# Patient Record
Sex: MALE | Race: OTHER | NOT HISPANIC OR LATINO | URBAN - METROPOLITAN AREA
[De-identification: names, ages, dates, MRNs, and addresses within clinical notes are randomized per-mention and may not be internally consistent; named-entity substitution may affect disease eponyms.]

---

## 2023-03-08 ENCOUNTER — EMERGENCY (EMERGENCY)
Facility: HOSPITAL | Age: 33
LOS: 1 days | Discharge: HOME | End: 2023-03-08
Attending: EMERGENCY MEDICINE | Admitting: EMERGENCY MEDICINE
Payer: MEDICAID

## 2023-03-08 VITALS
TEMPERATURE: 101 F | RESPIRATION RATE: 14 BRPM | HEART RATE: 73 BPM | OXYGEN SATURATION: 99 % | SYSTOLIC BLOOD PRESSURE: 124 MMHG | DIASTOLIC BLOOD PRESSURE: 75 MMHG

## 2023-03-08 VITALS
TEMPERATURE: 99 F | HEART RATE: 80 BPM | RESPIRATION RATE: 16 BRPM | DIASTOLIC BLOOD PRESSURE: 75 MMHG | OXYGEN SATURATION: 96 % | SYSTOLIC BLOOD PRESSURE: 115 MMHG

## 2023-03-08 PROCEDURE — 99284 EMERGENCY DEPT VISIT MOD MDM: CPT

## 2023-03-08 RX ORDER — ACETAMINOPHEN 500 MG
650 TABLET ORAL ONCE
Refills: 0 | Status: COMPLETED | OUTPATIENT
Start: 2023-03-08 | End: 2023-03-08

## 2023-03-08 RX ORDER — IBUPROFEN 200 MG
600 TABLET ORAL ONCE
Refills: 0 | Status: COMPLETED | OUTPATIENT
Start: 2023-03-08 | End: 2023-03-08

## 2023-03-08 RX ADMIN — Medication 650 MILLIGRAM(S): at 21:05

## 2023-03-08 RX ADMIN — Medication 1 TABLET(S): at 21:05

## 2023-03-08 RX ADMIN — Medication 600 MILLIGRAM(S): at 21:05

## 2023-03-08 NOTE — ED PROVIDER NOTE - CLINICAL SUMMARY MEDICAL DECISION MAKING FREE TEXT BOX
33M p/w R lower face swelling x 2 days + dental pain.  Pt went to own dentist and they rx'ed augmentin and pt has taken 2 doses and is due for one now.  last dose of tylenol/motrin this morning.  Never happened before.  Pt also reports sore throat, R lower face swelling, and odynophagia.  noted to be febrile here 100.7. PMHX sinus infections most recently last week.  PMHX sinus infection.  PSHX none.  No T.  NKDA.  Febrile.  Mild distress R face pain but nontoxic appearing.  R lateral face lower mild swelling.  R lower posterior molar lateral vestibular swelling and mild exudate there and mild ttp.  Airway patent.  Rx pain meds, abx, dental consult for drainage.  Imaging per Dental recc's.

## 2023-03-08 NOTE — ED PROVIDER NOTE - NSFOLLOWUPINSTRUCTIONS_ED_ALL_ED_FT
FOLLOW UP WITH YOUR  DENTIST WITHIN 1 WEEK  BRING THE COPIES OF YOUR RESULTS WITH YOU (PROVIDED)  CAN TAKE TYLENOL 650MG ORALLY EVERY 6 HOURS FOR PAIN OR FEVER.  IBUPROFEN 400MG ORALLY EVERY 6 HOURS FOR PAIN OR FEVER.    CAN TAKE TYLENOL AND IBUPROFEN AT THE SAME TIME  RETURN TO ED FOR NEW OR WORSENING SYMPTOMS.

## 2023-03-08 NOTE — ED PROVIDER NOTE - OBJECTIVE STATEMENT
33M p/w R lower face swelling x 2 days + dental pain.  Pt went to own dentist and they rx'ed augmentin and pt has taken 2 doses and is due for one now.  last dose of tylenol/motrin this morning.  Never happened before.  Pt also reports sore throat, R lower face swelling, and odynophagia.  noted to be febrile here 100.7. PMHX sinus infections most recently last week.  PMHX sinus infection.  PSHX none.  No T.  NKDA.  Febrile.  Mild distress R face pain but nontoxic appearing.  R lateral face lower mild swelling.  R lower posterior molar lateral vestibular swelling and mild exudate there and mild ttp.  Airway patent.  Rx pain meds, abx, dental consult for drainage.  Imaging per Dental recc's.    VS:  unremarkable except fever    GEN - malaise, mild distress R lower face pain   A+O x3   HEAD - NC/AT     ENT - PEERL, EOMI, mucous membranes    moist , no discharge  except  R lateral face lower mild swelling.  R lower posterior molar lateral vestibular swelling and mild exudate there and mild ttp.  Voice normal, airway patent, managing secretions, no trismus.  NECK: Neck supple, non-tender without lymphadenopathy, no masses, no JVD  PULM - CTA b/l,  symmetric breath sounds  COR -  normal heart sounds    ABD - , ND, NT, soft,  BACK - no CVA tenderness, nontender spine     EXTREMS - no edema, no deformity, warm and well perfused    SKIN - no rash    or bruising      NEUROLOGIC - alert, face symmetric, speech fluent, sensation nl, motor no focal deficit.

## 2023-03-08 NOTE — ED PROVIDER NOTE - PROGRESS NOTE DETAILS
DD ED ATTG:  abscess spontaneously drained prior to dental eval  OK for d/c home f/u dental as outpt.

## 2023-03-08 NOTE — ED PROVIDER NOTE - PATIENT PORTAL LINK FT
You can access the FollowMyHealth Patient Portal offered by [facility] by registering at the following website: [email or web address]. By joining [employer]’s FollowMyHealth portal, you will also be able to view your health information using other applications (apps) compatible with our system.

## 2023-03-08 NOTE — ED ADULT NURSE NOTE - OBJECTIVE STATEMENT
32 y/o M presents to ED intake rm 16 A&Ox4 c/o R molar pain x 2 days. pt was seen oupatient at DDS office, given Augmentin for infected molar. pain/swelling to area has increased since then. endorses pain, discharge, swelling to molar, back R side of tongue, and B/L tonsils, flu like symptoms, fevers, difficultly swallowing/chewing. denies difficulty breathing, c/p, SOB, weakness. no acute distress noted. respirations even and unlabored. no drooling or stridor noted. medicated as per MD orders. awaiting dental consult.

## 2023-03-08 NOTE — ED ADULT TRIAGE NOTE - CHIEF COMPLAINT QUOTE
pt c/o 4 days  worsening oral / throat pain with swelling difficulty swallowing, pt being treated for right sided dental abbess with antibiotics x 2 days  still having pain/ fevers. PMH: TMJ

## 2023-03-09 NOTE — PROGRESS NOTE ADULT - SUBJECTIVE AND OBJECTIVE BOX
32y/o M patient presents w/ sister w/ CC of R. facial swelling. Pt went to general dentist w/ CC of LR pain and swelling. Patient was then referred to endodontist, abx rx for augmentin given, patient was told tooth needed RCT and to be started following abx completion. Patient afebrile at time of dental eval. PMHX sinus infections most recently last week. ED reports LR posterior drainage observed during exam.   Patient states prior to dental eval during ED eval, area of swelling relief w/ increased drainage. Patient reports difficulty breathing and swallowing has decreased significantly prior to dental eval. Patient points to #31 as site of discomfort, states spontaneous, lingering pain.    PAST MEDICAL & SURGICAL HISTORY: no pertinent hx    MEDICATIONS  (STANDING): patient denies    Allergies    No Known Allergies    Vital Signs Last 24 Hrs  T(C): 37 (08 Mar 2023 22:36), Max: 38.2 (08 Mar 2023 19:20)  T(F): 98.6 (08 Mar 2023 22:36), Max: 100.7 (08 Mar 2023 19:20)  HR: 80 (08 Mar 2023 22:36) (73 - 80)  BP: 115/75 (08 Mar 2023 22:36) (115/75 - 124/75)  BP(mean): --  RR: 16 (08 Mar 2023 22:36) (14 - 16)  SpO2: 96% (08 Mar 2023 22:36) (96% - 99%)    EOE:  TMJ ( -  ) clicks                    ( -   ) pops                    (  -  ) crepitus             ( -  ) trismus             (  - ) LAD             (  + ) swelling: R. facial swelling submandibular region             (  + ) asymmetry: due to swelling             (  + ) palpation: R. submandibular region             (  - ) SOB             (  + ) dysphagia: patient states has decreased while in ED             (  - ) LOC    IOE:  Permanent dentition grossly intact.           ( -  ) percussion           (  +) palpation: buccal #31           (  + ) swelling: slight fluctuant swelling LR posterior buccal vestibule; sublingual w/ hemopurulent drainage appreciated    DENTAL RADIOGRAPHS: PAs taken and interpreted.  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 Discussed findings w/ patient and patient's sister. Discussed I&D, due to abscess draining prior to dental eval patient denied I&D. Advised patient to continue abx rx and see outpatient dentist for tx. Patient reports improvement in symptoms compared to initial arrival. Patient understood, all questions answered.     RECOMMENDATIONS:  1) abx and pain meds rx as per ED team  2/ f/u w/ outpatient dentist for tx #31  3) Dental F/U with outpatient dentist for comprehensive dental care.   4) Return to ED if difficulty swallowing or breathing or symptoms progeskendell Naranjo DDS #[id number]    Resident Name, pager #